# Patient Record
Sex: MALE | Race: WHITE
[De-identification: names, ages, dates, MRNs, and addresses within clinical notes are randomized per-mention and may not be internally consistent; named-entity substitution may affect disease eponyms.]

---

## 2021-06-01 ENCOUNTER — APPOINTMENT (OUTPATIENT)
Dept: BARIATRICS | Facility: CLINIC | Age: 31
End: 2021-06-01
Payer: COMMERCIAL

## 2021-06-01 VITALS — BODY MASS INDEX: 35.07 KG/M2 | WEIGHT: 288 LBS | HEIGHT: 76 IN

## 2021-06-01 DIAGNOSIS — M54.12 RADICULOPATHY, CERVICAL REGION: ICD-10-CM

## 2021-06-01 DIAGNOSIS — Z78.9 OTHER SPECIFIED HEALTH STATUS: ICD-10-CM

## 2021-06-01 DIAGNOSIS — Z00.00 ENCOUNTER FOR GENERAL ADULT MEDICAL EXAMINATION W/OUT ABNORMAL FINDINGS: ICD-10-CM

## 2021-06-01 DIAGNOSIS — Z87.891 PERSONAL HISTORY OF NICOTINE DEPENDENCE: ICD-10-CM

## 2021-06-01 DIAGNOSIS — Z83.3 FAMILY HISTORY OF DIABETES MELLITUS: ICD-10-CM

## 2021-06-01 DIAGNOSIS — Z82.49 FAMILY HISTORY OF ISCHEMIC HEART DISEASE AND OTHER DISEASES OF THE CIRCULATORY SYSTEM: ICD-10-CM

## 2021-06-01 DIAGNOSIS — Z83.438 FAMILY HISTORY OF OTHER DISORDER OF LIPOPROTEIN METABOLISM AND OTHER LIPIDEMIA: ICD-10-CM

## 2021-06-01 PROCEDURE — 99205 OFFICE O/P NEW HI 60 MIN: CPT | Mod: 95

## 2021-06-01 RX ORDER — MONTELUKAST 10 MG/1
10 TABLET, FILM COATED ORAL
Qty: 90 | Refills: 0 | Status: ACTIVE | COMMUNITY
Start: 2021-01-28

## 2021-06-01 RX ORDER — ALBUTEROL SULFATE 90 UG/1
AEROSOL, METERED RESPIRATORY (INHALATION)
Refills: 0 | Status: ACTIVE | COMMUNITY

## 2021-06-01 RX ORDER — FLUTICASONE FUROATE, UMECLIDINIUM BROMIDE AND VILANTEROL TRIFENATATE 200; 62.5; 25 UG/1; UG/1; UG/1
POWDER RESPIRATORY (INHALATION)
Refills: 0 | Status: ACTIVE | COMMUNITY

## 2021-06-02 ENCOUNTER — TRANSCRIPTION ENCOUNTER (OUTPATIENT)
Age: 31
End: 2021-06-02

## 2021-06-04 NOTE — ASSESSMENT
[FreeTextEntry1] : Diagnosis: Obesity Class 2 \par Educated on the importance of healthy lifestyle choices to help with weight loss and prevent co-morbidities of obesity. \par Dietary Modifications- discussed healthy alternatives of carbohydrate & fat intake. Recommend high lean protein intake, complex carbs and healthy fat choices. Discussed ways to reduce late night snacking including- hobbies/ activities that requires hands, taking a walk, talking with friends, avoiding marijuana gummies if they cause hunger, and trying to go to sleep earlier. Start tracking dietary intake daily. \par Exercise: recommend incorporating strength training. Set a goal to add 15 minutes of resistance bands 3 times per week, set new daily steps goal of 7,000 steps daily. Exercise can help with anxiety/depression as well. \par Medication- pt meets criteria to initiate medication treatment for obesity, wants to try lifestyle modification alone at this time. \par Labs- had labs done recently, awaiting results \par RTO in 2 weeks for frequent follow up

## 2021-06-04 NOTE — HISTORY OF PRESENT ILLNESS
[Home] : at home, [unfilled] , at the time of the visit. [Other Location: e.g. Home (Enter Location, City,State)___] : at [unfilled] [Verbal consent obtained from patient] : the patient, [unfilled] [Improved Health] : Improved health [Improved Looks/Aesthetics] : Improved looks/aesthetics [Commerial Program: _____] : commercial program: [unfilled] [Poor eating habits] : poor eating habits [Family/friends] : family/friends [Cravings] : cravings [Motivation] : motivation [Depression/anxiety] : depression/anxiety [Change in activity level] : change in activity level [I usually sleep 6-8 hours] : I usually sleep 6-8 hours [Lunch] : lunch [Dinner] : dinner [______] : [unfilled] [Evening] : evening [Late night] : late night [Crunchy] : crunchy [Salty] : salty [8+] : How many cups of water do you regularly drink per day: 8+ [3] : Cups of coffee per day: 3 [Other: ____] : [unfilled] [Self] : self [Parent] : parent [Emotional Eating] : emotional eating [Boredom Eating] : boredom eating [2+ miles] : Walking distance capability: 2+ miles [Walking] : walking [0] : 0 [None] : none [HTN] : HTN [GERD] : GERD [FreeTextEntry1] : 30 y/o male here for medical weight loss consultation\par Referred by a friend that’s in the program\par Works full time as [employer] , has been struggling with weight gain since he started this job where he sits most of the day\par Exercise: uses lunch hour to walk, gets in about 6,000 steps daily. Denies doing strength training exercising. Enjoys working out at the gym but does want to wear a mask at the gym. Struggles with allergies and asthma, which holds him back as well. \par Dietary Intake- tracks macros\par Food Recall: B- eggs, peas, sausage on bagel; L- skipped, D- hamburger, ribs, sausage, salad, 1 potato. \par Nighttime Snacking- popcorn, granola, kind bar, almonds\par Lives with parents and has access to unhealthy food choices\par Medical Hx: Asthma, HTN, Cervical Radiculopathy, anxiety, depression, ADHD\par Sleeps about 7 hours each night, goes to bed at 2 am \par Water Intake- adequate \par

## 2021-10-21 ENCOUNTER — APPOINTMENT (OUTPATIENT)
Dept: BARIATRICS/WEIGHT MGMT | Facility: CLINIC | Age: 31
End: 2021-10-21
Payer: COMMERCIAL

## 2021-10-21 VITALS — WEIGHT: 272 LBS | HEIGHT: 76 IN | BODY MASS INDEX: 33.12 KG/M2

## 2021-10-21 PROCEDURE — 99214 OFFICE O/P EST MOD 30 MIN: CPT | Mod: 95

## 2021-10-21 NOTE — HISTORY OF PRESENT ILLNESS
[Other Location: e.g. School (Enter Location, City,State)___] : at [unfilled], at the time of the visit. [Other Location: e.g. Home (Enter Location, City,State)___] : at [unfilled] [Verbal consent obtained from patient] : the patient, [unfilled] [FreeTextEntry1] : 30 y/o male here for medical weight loss consultation\par Referred by a friend that’s in the program\par Works full time as [employer] , has been struggling with weight gain since he started this job where he sits most of the day\par Exercise: uses lunch hour to walk, gets in about 6,000 steps daily. Denies doing strength training exercising. Enjoys working out at the gym but does want to wear a mask at the gym. Struggles with allergies and asthma, which holds him back as well. \par Dietary Intake- tracks macros\par Food Recall: B- eggs, peas, sausage on bagel; L- skipped, D- hamburger, ribs, sausage, salad, 1 potato. \par Nighttime Snacking- popcorn, granola, kind bar, almonds\par Lives with parents and has access to unhealthy food choices\par Medical Hx: Asthma, HTN, Cervical Radiculopathy, anxiety, depression, ADHD\par Sleeps about 7 hours each night, goes to bed at 2 am \par Water Intake- adequate \par \par 10/21/21: Lost 16 lbs. Made modifications to lifestyle including: eating breakfast, stops eating earlier at night, decreased snacking, plans breakfast & lunch daily, and measures out appropriate portions. Reports feeling less stressed, started a new job at [employer], operated by [employer]. Food recall: B- overnight oats, L- baked chicken parm with lentil soup, D- roasted pork, broccoli, 1/2 cup of britton white rice. Water intake inadequate. Increased daily steps to 14,000 steps daily. Has access to a gym at the office- doesn't go at present.

## 2021-10-21 NOTE — ASSESSMENT
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

## 2021-12-07 ENCOUNTER — TRANSCRIPTION ENCOUNTER (OUTPATIENT)
Age: 31
End: 2021-12-07

## 2021-12-20 ENCOUNTER — APPOINTMENT (OUTPATIENT)
Dept: BARIATRICS/WEIGHT MGMT | Facility: CLINIC | Age: 31
End: 2021-12-20

## 2023-08-22 RX ORDER — PANTOPRAZOLE SODIUM 40 MG/1
40 TABLET, DELAYED RELEASE ORAL
Refills: 0 | Status: ACTIVE | COMMUNITY

## 2023-08-22 RX ORDER — GUAIFENESIN 600 MG/1
600 TABLET, EXTENDED RELEASE ORAL
Refills: 0 | Status: ACTIVE | COMMUNITY

## 2023-08-22 RX ORDER — ALBUTEROL 90 MCG
90 AEROSOL (GRAM) INHALATION
Refills: 0 | Status: ACTIVE | COMMUNITY

## 2023-08-22 RX ORDER — FLUTICASONE FUROATE, UMECLIDINIUM BROMIDE AND VILANTEROL TRIFENATATE 200; 62.5; 25 UG/1; UG/1; UG/1
200-62.5-25 POWDER RESPIRATORY (INHALATION)
Refills: 0 | Status: ACTIVE | COMMUNITY

## 2023-08-30 ENCOUNTER — APPOINTMENT (OUTPATIENT)
Dept: PULMONOLOGY | Facility: CLINIC | Age: 33
End: 2023-08-30
Payer: COMMERCIAL

## 2023-08-30 ENCOUNTER — LABORATORY RESULT (OUTPATIENT)
Age: 33
End: 2023-08-30

## 2023-08-30 VITALS
BODY MASS INDEX: 33.36 KG/M2 | SYSTOLIC BLOOD PRESSURE: 126 MMHG | HEIGHT: 76 IN | HEART RATE: 71 BPM | WEIGHT: 274 LBS | OXYGEN SATURATION: 98 % | DIASTOLIC BLOOD PRESSURE: 70 MMHG

## 2023-08-30 DIAGNOSIS — T73.2XXS EXHAUSTION DUE TO EXPOSURE, SEQUELA: ICD-10-CM

## 2023-08-30 PROCEDURE — 99203 OFFICE O/P NEW LOW 30 MIN: CPT

## 2023-08-30 NOTE — PHYSICAL EXAM
[No Acute Distress] : no acute distress [Normal Appearance] : normal appearance [No Neck Mass] : no neck mass [Normal Rate/Rhythm] : normal rate/rhythm [Normal S1, S2] : normal s1, s2 [No Murmurs] : no murmurs [No Resp Distress] : no resp distress [No Acc Muscle Use] : no acc muscle use [Clear to Auscultation Bilaterally] : clear to auscultation bilaterally [No Clubbing] : no clubbing [No Cyanosis] : no cyanosis [No Edema] : no edema [No Focal Deficits] : no focal deficits [Oriented x3] : oriented x3 [Normal Affect] : normal affect [TextBox_68] : no wheezes/rhonchi

## 2023-08-30 NOTE — REVIEW OF SYSTEMS
[Cough] : no cough [Sputum] : no sputum [Dyspnea] : no dyspnea [Pleuritic Pain] : no pleuritic pain [Wheezing] : no wheezing [SOB on Exertion] : no sob on exertion [Orthopnea] : no orthopnea [Syncope] : no syncope [GERD] : no gerd [Diarrhea] : no diarrhea [Food Intolerance] : no food intolerance [Trauma/ Injury] : no trauma/ injury [Chronic Pain] : no chronic pain [Anxiety] : anxiety [Negative] : HEENT

## 2023-08-30 NOTE — DISCUSSION/SUMMARY
[FreeTextEntry1] : 33M with asthma here to establish care  #Asthma  - Currently well controlled on regimen of trelegy and albuterol as needed. Also on singulair - He is taking Trelegy every few days with good result so discussed stepping down therapy but Pt prefers to stay on current regimen - Will repeat labs now to evaluate IgE and eos - PFTs ordered now to evaluate lung function - Discussed that asthma is not a contraindication for scuba diving, however, there may be anxiety component which can exacerbate asthma symptoms and worsened while diving. He expresses he is actively seeking mental health care to address his anxiety, especially in light of recent work-related issues. - HIV test ordered today per request, as part of annual routine testing for patient  Will call Pt with results of PFTs and labs

## 2023-08-30 NOTE — HISTORY OF PRESENT ILLNESS
[Never] : never [TextBox_4] : Mr. Kitchen is a 33M here to establish care for asthma. He was previously following with Dr. Rodriguez.  Diagnosed with asthma in his teens. Generally well controlled but recently has had a lot of anxiety due to work-related matters that affects his nerves which also affect his asthma.  Triggers: emotional stress/anxiety, dogs/cats/oak/mice, exertion Exacerbations: rare Current regimen: Trelegy (once every 3 days), Ventolin (rare use, only use as pre-medication before working out); singulair daily  He has been doing well on Trelegy but only takes it every 3 days. He had issues with mucositis/thrush in the past so currently on this regimen.  He is going on a vacation with his boyfriend and family next Jan 2024 to [place]. They plan to scuba dive and he is considering getting scuba certified. He is inquiring about his ability to scuba dive with asthma.

## 2023-08-31 LAB
BASOPHILS # BLD AUTO: 0.09 K/UL
BASOPHILS NFR BLD AUTO: 1 %
EOSINOPHIL # BLD AUTO: 0.43 K/UL
EOSINOPHIL NFR BLD AUTO: 4.8 %
HCT VFR BLD CALC: 50.9 %
HGB BLD-MCNC: 15.8 G/DL
IMM GRANULOCYTES NFR BLD AUTO: 0.1 %
LYMPHOCYTES # BLD AUTO: 2.84 K/UL
LYMPHOCYTES NFR BLD AUTO: 31.4 %
MAN DIFF?: NORMAL
MCHC RBC-ENTMCNC: 26.7 PG
MCHC RBC-ENTMCNC: 31 GM/DL
MCV RBC AUTO: 86.1 FL
MONOCYTES # BLD AUTO: 0.56 K/UL
MONOCYTES NFR BLD AUTO: 6.2 %
NEUTROPHILS # BLD AUTO: 5.11 K/UL
NEUTROPHILS NFR BLD AUTO: 56.5 %
PLATELET # BLD AUTO: 265 K/UL
RBC # BLD: 5.91 M/UL
RBC # FLD: 14.4 %
WBC # FLD AUTO: 9.04 K/UL

## 2023-09-01 LAB
A ALTERNATA IGE QN: <0.1 KUA/L
A FUMIGATUS IGE QN: <0.1 KUA/L
C ALBICANS IGE QN: <0.1 KUA/L
C HERBARUM IGE QN: <0.1 KUA/L
CAT DANDER IGE QN: 11.6 KUA/L
COMMON RAGWEED IGE QN: <0.1 KUA/L
D FARINAE IGE QN: <0.1 KUA/L
D PTERONYSS IGE QN: <0.1 KUA/L
DEPRECATED A ALTERNATA IGE RAST QL: 0
DEPRECATED A FUMIGATUS IGE RAST QL: 0
DEPRECATED C ALBICANS IGE RAST QL: 0
DEPRECATED C HERBARUM IGE RAST QL: 0
DEPRECATED CAT DANDER IGE RAST QL: 3
DEPRECATED COMMON RAGWEED IGE RAST QL: 0
DEPRECATED D FARINAE IGE RAST QL: 0
DEPRECATED D PTERONYSS IGE RAST QL: 0
DEPRECATED DOG DANDER IGE RAST QL: 3
DEPRECATED M RACEMOSUS IGE RAST QL: 0
DEPRECATED ROACH IGE RAST QL: NORMAL
DEPRECATED TIMOTHY IGE RAST QL: 0
DEPRECATED WHITE OAK IGE RAST QL: 1
DOG DANDER IGE QN: 5.93 KUA/L
M RACEMOSUS IGE QN: <0.1 KUA/L
ROACH IGE QN: 0.1 KUA/L
TIMOTHY IGE QN: <0.1 KUA/L
TOTAL IGE SMQN RAST: 119 KU/L
WHITE OAK IGE QN: 0.38 KUA/L

## 2023-11-07 ENCOUNTER — APPOINTMENT (OUTPATIENT)
Dept: PULMONOLOGY | Facility: CLINIC | Age: 33
End: 2023-11-07
Payer: COMMERCIAL

## 2023-11-07 VITALS
OXYGEN SATURATION: 96 % | HEART RATE: 114 BPM | HEIGHT: 76 IN | BODY MASS INDEX: 36.29 KG/M2 | DIASTOLIC BLOOD PRESSURE: 94 MMHG | SYSTOLIC BLOOD PRESSURE: 142 MMHG | WEIGHT: 298 LBS

## 2023-11-07 PROCEDURE — 99213 OFFICE O/P EST LOW 20 MIN: CPT

## 2023-12-07 ENCOUNTER — APPOINTMENT (OUTPATIENT)
Dept: BARIATRICS/WEIGHT MGMT | Facility: CLINIC | Age: 33
End: 2023-12-07
Payer: COMMERCIAL

## 2023-12-07 VITALS
DIASTOLIC BLOOD PRESSURE: 76 MMHG | WEIGHT: 294.38 LBS | HEIGHT: 75 IN | SYSTOLIC BLOOD PRESSURE: 136 MMHG | OXYGEN SATURATION: 99 % | BODY MASS INDEX: 36.6 KG/M2 | HEART RATE: 88 BPM | RESPIRATION RATE: 16 BRPM

## 2023-12-07 PROCEDURE — 97802 MEDICAL NUTRITION INDIV IN: CPT

## 2023-12-20 RX ORDER — ALBUTEROL SULFATE 90 UG/1
108 (90 BASE) INHALANT RESPIRATORY (INHALATION) EVERY 4 HOURS
Qty: 1 | Refills: 5 | Status: ACTIVE | COMMUNITY
Start: 2023-11-07 | End: 1900-01-01

## 2023-12-20 RX ORDER — ALBUTEROL SULFATE 2.5 MG/3ML
(2.5 MG/3ML) SOLUTION RESPIRATORY (INHALATION)
Qty: 3 | Refills: 3 | Status: ACTIVE | COMMUNITY
Start: 2023-12-20 | End: 1900-01-01

## 2024-01-04 ENCOUNTER — APPOINTMENT (OUTPATIENT)
Dept: BARIATRICS/WEIGHT MGMT | Facility: CLINIC | Age: 34
End: 2024-01-04
Payer: COMMERCIAL

## 2024-01-04 VITALS
HEART RATE: 84 BPM | BODY MASS INDEX: 36.43 KG/M2 | DIASTOLIC BLOOD PRESSURE: 86 MMHG | OXYGEN SATURATION: 96 % | WEIGHT: 293 LBS | HEIGHT: 75 IN | RESPIRATION RATE: 16 BRPM | SYSTOLIC BLOOD PRESSURE: 124 MMHG

## 2024-01-04 VITALS — WEIGHT: 293.2 LBS | BODY MASS INDEX: 36.65 KG/M2

## 2024-01-04 PROCEDURE — 97803 MED NUTRITION INDIV SUBSEQ: CPT

## 2024-02-29 ENCOUNTER — APPOINTMENT (OUTPATIENT)
Dept: BARIATRICS/WEIGHT MGMT | Facility: CLINIC | Age: 34
End: 2024-02-29
Payer: COMMERCIAL

## 2024-02-29 VITALS — BODY MASS INDEX: 35.87 KG/M2 | WEIGHT: 287 LBS

## 2024-02-29 DIAGNOSIS — E66.9 OBESITY, UNSPECIFIED: ICD-10-CM

## 2024-02-29 DIAGNOSIS — I10 ESSENTIAL (PRIMARY) HYPERTENSION: ICD-10-CM

## 2024-02-29 PROCEDURE — 97803 MED NUTRITION INDIV SUBSEQ: CPT | Mod: 95

## 2024-05-02 ENCOUNTER — APPOINTMENT (OUTPATIENT)
Dept: BARIATRICS/WEIGHT MGMT | Facility: CLINIC | Age: 34
End: 2024-05-02

## 2024-05-09 ENCOUNTER — RX RENEWAL (OUTPATIENT)
Age: 34
End: 2024-05-09

## 2024-06-18 ENCOUNTER — APPOINTMENT (OUTPATIENT)
Dept: PULMONOLOGY | Facility: CLINIC | Age: 34
End: 2024-06-18
Payer: COMMERCIAL

## 2024-06-18 VITALS
BODY MASS INDEX: 35.31 KG/M2 | HEART RATE: 96 BPM | SYSTOLIC BLOOD PRESSURE: 140 MMHG | WEIGHT: 284 LBS | HEIGHT: 75 IN | OXYGEN SATURATION: 96 % | TEMPERATURE: 98 F | DIASTOLIC BLOOD PRESSURE: 80 MMHG

## 2024-06-18 DIAGNOSIS — K21.9 GASTRO-ESOPHAGEAL REFLUX DISEASE W/OUT ESOPHAGITIS: ICD-10-CM

## 2024-06-18 DIAGNOSIS — J45.909 UNSPECIFIED ASTHMA, UNCOMPLICATED: ICD-10-CM

## 2024-06-18 PROCEDURE — 99213 OFFICE O/P EST LOW 20 MIN: CPT

## 2024-06-18 PROCEDURE — G2211 COMPLEX E/M VISIT ADD ON: CPT

## 2024-06-18 RX ORDER — FLUTICASONE FUROATE, UMECLIDINIUM BROMIDE AND VILANTEROL TRIFENATATE 200; 62.5; 25 UG/1; UG/1; UG/1
200-62.5-25 POWDER RESPIRATORY (INHALATION) DAILY
Qty: 1 | Refills: 5 | Status: ACTIVE | COMMUNITY
Start: 2023-11-07 | End: 1900-01-01

## 2024-06-18 RX ORDER — FAMOTIDINE 20 MG/1
20 TABLET, FILM COATED ORAL
Qty: 30 | Refills: 3 | Status: ACTIVE | COMMUNITY
Start: 2024-06-18 | End: 1900-01-01

## 2024-06-18 RX ORDER — MONTELUKAST 10 MG/1
10 TABLET, FILM COATED ORAL
Qty: 30 | Refills: 5 | Status: ACTIVE | COMMUNITY
Start: 2023-11-07 | End: 1900-01-01

## 2024-06-18 NOTE — PHYSICAL EXAM
[No Acute Distress] : no acute distress [Normal Rate/Rhythm] : normal rate/rhythm [Normal S1, S2] : normal s1, s2 [No Murmurs] : no murmurs [No Resp Distress] : no resp distress [No Acc Muscle Use] : no acc muscle use [Clear to Auscultation Bilaterally] : clear to auscultation bilaterally [No Clubbing] : no clubbing [No Cyanosis] : no cyanosis [No Edema] : no edema [No Focal Deficits] : no focal deficits [Oriented x3] : oriented x3 [Normal Affect] : normal affect [TextBox_68] : no wheezes/rhonchi

## 2024-06-18 NOTE — REVIEW OF SYSTEMS
[Cough] : no cough [Sputum] : no sputum [Dyspnea] : dyspnea [Pleuritic Pain] : no pleuritic pain [Wheezing] : wheezing [SOB on Exertion] : sob on exertion [Orthopnea] : no orthopnea [Syncope] : no syncope [GERD] : no gerd [Diarrhea] : no diarrhea [Food Intolerance] : no food intolerance [Trauma/ Injury] : no trauma/ injury [Chronic Pain] : no chronic pain [Anxiety] : anxiety [Negative] : HEENT

## 2024-06-18 NOTE — HISTORY OF PRESENT ILLNESS
[Never] : never [TextBox_4] : Mr. Coleman is a 33M here to establish care for asthma. He was previously following with Dr. Rodriguez.  Diagnosed with asthma in his teens. Generally well controlled but recently has had a lot of anxiety due to work-related matters that affects his nerves which also affect his asthma.  Triggers: emotional stress/anxiety, dogs/cats/oak/mice, exertion Exacerbations: rare Current regimen: Trelegy (once every 3 days), Ventolin (rare use, only use as pre-medication before working out); singulair daily  He has been doing well on Trelegy but only takes it every 3 days. He had issues with mucositis/thrush in the past so currently on this regimen.  He is going on a vacation with his boyfriend and family next Jan 2024 to [place]. They plan to scuba dive and he is considering getting scuba certified. He is inquiring about his ability to scuba dive with asthma.  11/2023 Mr. Coleman returns today for follow-up asthma. He has been off his inhalers for the past 2 weeks as his previous prescriptions ran out and so he needs new Rx's. He is feeling more shortness of breath and chest tightness with wheezing. He's been taking more OTC mucinex and singulair. Also been using proair about 3 times daily. Today he also mentions that he is frustrated with his weight. He has a strong FHx of T2DM and CAD (his father lost his leg to complications from DM). He is motivated to make lifestyle changes but there are factors in his life that makes it hard and he would like help. He met with [facility] and had a consultation with a surgeon who mentioned that surgery is basically the only option and he felt that he'd like to try to lose weight on his own first. He met with a nutritionist within [facility] and also did not feel his needs were met.  6/2024 Mr. COLEMAN returns today for follow-up. Doing well since last visit but notes that his GERD is really bad lately and so would like to start medications again. He'd like to avoid PPI due to concern about dementia risk as he has a family history of dementia in his grandmother. He has been taking his Trelegy but only once every 3 days due to mouth sores. He has changed his diet significantly and has lost some weight and remains motivated to continue losing weight.

## 2024-06-18 NOTE — DISCUSSION/SUMMARY
[FreeTextEntry1] : 34M with asthma here for follow-up asthma  #Asthma  - Taking Trelegy q3 days due to mouth sores. Oral exam today shows shallow/flat mucosal erosion in hard palate; no e/o thrush. Discussed trying alternative inhaler regimen vs nebulizer therapy but he declines at this time - Also discussed biologics to improve asthma control but he declines as well. - Famotidine Rx sent today for GERD - PFTs reordered today - Encouraged continued weight loss

## 2024-09-27 ENCOUNTER — NON-APPOINTMENT (OUTPATIENT)
Age: 34
End: 2024-09-27

## 2024-10-12 ENCOUNTER — NON-APPOINTMENT (OUTPATIENT)
Age: 34
End: 2024-10-12

## 2024-11-07 ENCOUNTER — APPOINTMENT (OUTPATIENT)
Dept: PULMONOLOGY | Facility: CLINIC | Age: 34
End: 2024-11-07

## 2024-11-07 VITALS
HEART RATE: 83 BPM | DIASTOLIC BLOOD PRESSURE: 84 MMHG | BODY MASS INDEX: 35.31 KG/M2 | SYSTOLIC BLOOD PRESSURE: 112 MMHG | WEIGHT: 284 LBS | HEIGHT: 75 IN | OXYGEN SATURATION: 97 %

## 2024-11-07 DIAGNOSIS — J45.909 UNSPECIFIED ASTHMA, UNCOMPLICATED: ICD-10-CM

## 2024-11-07 DIAGNOSIS — Z23 ENCOUNTER FOR IMMUNIZATION: ICD-10-CM

## 2024-11-07 PROCEDURE — G0008: CPT

## 2024-11-07 PROCEDURE — 99213 OFFICE O/P EST LOW 20 MIN: CPT | Mod: 25

## 2024-11-07 PROCEDURE — 90686 IIV4 VACC NO PRSV 0.5 ML IM: CPT

## 2025-02-18 ENCOUNTER — RX RENEWAL (OUTPATIENT)
Age: 35
End: 2025-02-18

## 2025-05-07 ENCOUNTER — APPOINTMENT (OUTPATIENT)
Dept: PULMONOLOGY | Facility: CLINIC | Age: 35
End: 2025-05-07
Payer: COMMERCIAL

## 2025-05-07 VITALS
BODY MASS INDEX: 34.69 KG/M2 | SYSTOLIC BLOOD PRESSURE: 102 MMHG | HEIGHT: 75 IN | DIASTOLIC BLOOD PRESSURE: 80 MMHG | OXYGEN SATURATION: 98 % | HEART RATE: 86 BPM | WEIGHT: 279 LBS

## 2025-05-07 DIAGNOSIS — J45.909 UNSPECIFIED ASTHMA, UNCOMPLICATED: ICD-10-CM

## 2025-05-07 DIAGNOSIS — K21.9 GASTRO-ESOPHAGEAL REFLUX DISEASE W/OUT ESOPHAGITIS: ICD-10-CM

## 2025-05-07 PROCEDURE — 99214 OFFICE O/P EST MOD 30 MIN: CPT

## 2025-05-07 PROCEDURE — G2211 COMPLEX E/M VISIT ADD ON: CPT | Mod: NC

## 2025-05-07 RX ORDER — PANTOPRAZOLE 40 MG/1
40 TABLET, DELAYED RELEASE ORAL DAILY
Qty: 30 | Refills: 5 | Status: ACTIVE | COMMUNITY
Start: 1900-01-01 | End: 1900-01-01

## 2025-05-20 ENCOUNTER — RX RENEWAL (OUTPATIENT)
Age: 35
End: 2025-05-20

## 2025-08-12 ENCOUNTER — NON-APPOINTMENT (OUTPATIENT)
Age: 35
End: 2025-08-12

## 2025-08-13 ENCOUNTER — NON-APPOINTMENT (OUTPATIENT)
Age: 35
End: 2025-08-13

## 2025-08-13 ENCOUNTER — APPOINTMENT (OUTPATIENT)
Dept: FAMILY MEDICINE | Facility: CLINIC | Age: 35
End: 2025-08-13
Payer: COMMERCIAL

## 2025-08-13 VITALS
SYSTOLIC BLOOD PRESSURE: 130 MMHG | OXYGEN SATURATION: 96 % | TEMPERATURE: 98.3 F | HEIGHT: 75 IN | HEART RATE: 96 BPM | WEIGHT: 284 LBS | BODY MASS INDEX: 35.31 KG/M2 | DIASTOLIC BLOOD PRESSURE: 92 MMHG

## 2025-08-13 VITALS — DIASTOLIC BLOOD PRESSURE: 76 MMHG | SYSTOLIC BLOOD PRESSURE: 124 MMHG

## 2025-08-13 DIAGNOSIS — I10 ESSENTIAL (PRIMARY) HYPERTENSION: ICD-10-CM

## 2025-08-13 DIAGNOSIS — K21.9 GASTRO-ESOPHAGEAL REFLUX DISEASE W/OUT ESOPHAGITIS: ICD-10-CM

## 2025-08-13 DIAGNOSIS — R10.31 RIGHT LOWER QUADRANT PAIN: ICD-10-CM

## 2025-08-13 DIAGNOSIS — G89.29 RIGHT LOWER QUADRANT PAIN: ICD-10-CM

## 2025-08-13 DIAGNOSIS — Z00.00 ENCOUNTER FOR GENERAL ADULT MEDICAL EXAMINATION W/OUT ABNORMAL FINDINGS: ICD-10-CM

## 2025-08-13 DIAGNOSIS — E66.812 OBESITY, CLASS 2: ICD-10-CM

## 2025-08-13 DIAGNOSIS — Z80.0 FAMILY HISTORY OF MALIGNANT NEOPLASM OF DIGESTIVE ORGANS: ICD-10-CM

## 2025-08-13 DIAGNOSIS — R09.81 NASAL CONGESTION: ICD-10-CM

## 2025-08-13 PROCEDURE — G0444 DEPRESSION SCREEN ANNUAL: CPT | Mod: 59

## 2025-08-13 PROCEDURE — 99385 PREV VISIT NEW AGE 18-39: CPT

## 2025-08-15 ENCOUNTER — TRANSCRIPTION ENCOUNTER (OUTPATIENT)
Age: 35
End: 2025-08-15

## 2025-08-15 LAB
ALBUMIN SERPL ELPH-MCNC: 4.5 G/DL
ALP BLD-CCNC: 86 U/L
ALT SERPL-CCNC: 36 U/L
ANION GAP SERPL CALC-SCNC: 15 MMOL/L
AST SERPL-CCNC: 28 U/L
BASOPHILS # BLD AUTO: 0.11 K/UL
BASOPHILS NFR BLD AUTO: 1.2 %
BILIRUB SERPL-MCNC: 0.8 MG/DL
BUN SERPL-MCNC: 26 MG/DL
CALCIUM SERPL-MCNC: 9.7 MG/DL
CHLORIDE SERPL-SCNC: 103 MMOL/L
CHOLEST SERPL-MCNC: 201 MG/DL
CO2 SERPL-SCNC: 23 MMOL/L
CREAT SERPL-MCNC: 0.92 MG/DL
EGFRCR SERPLBLD CKD-EPI 2021: 113 ML/MIN/1.73M2
EOSINOPHIL # BLD AUTO: 0.73 K/UL
EOSINOPHIL NFR BLD AUTO: 7.8 %
ESTIMATED AVERAGE GLUCOSE: 108 MG/DL
GLUCOSE SERPL-MCNC: 91 MG/DL
HBA1C MFR BLD HPLC: 5.4 %
HCT VFR BLD CALC: 51.2 %
HDLC SERPL-MCNC: 36 MG/DL
HGB BLD-MCNC: 16.2 G/DL
IMM GRANULOCYTES NFR BLD AUTO: 0.2 %
LDLC SERPL-MCNC: 123 MG/DL
LYMPHOCYTES # BLD AUTO: 2.62 K/UL
LYMPHOCYTES NFR BLD AUTO: 28.1 %
MAN DIFF?: NORMAL
MCHC RBC-ENTMCNC: 27.8 PG
MCHC RBC-ENTMCNC: 31.6 G/DL
MCV RBC AUTO: 88 FL
MONOCYTES # BLD AUTO: 0.69 K/UL
MONOCYTES NFR BLD AUTO: 7.4 %
NEUTROPHILS # BLD AUTO: 5.16 K/UL
NEUTROPHILS NFR BLD AUTO: 55.3 %
NONHDLC SERPL-MCNC: 164 MG/DL
PLATELET # BLD AUTO: 281 K/UL
POTASSIUM SERPL-SCNC: 4.6 MMOL/L
PROT SERPL-MCNC: 7.2 G/DL
RBC # BLD: 5.82 M/UL
RBC # FLD: 13.3 %
SODIUM SERPL-SCNC: 141 MMOL/L
TRIGL SERPL-MCNC: 233 MG/DL
TSH SERPL-ACNC: 1.38 UIU/ML
WBC # FLD AUTO: 9.33 K/UL

## 2025-09-05 ENCOUNTER — RX RENEWAL (OUTPATIENT)
Age: 35
End: 2025-09-05

## 2025-09-08 RX ORDER — ALBUTEROL SULFATE 90 UG/1
108 (90 BASE) AEROSOL, METERED RESPIRATORY (INHALATION)
Qty: 1 | Refills: 5 | Status: ACTIVE | COMMUNITY
Start: 2025-09-08 | End: 1900-01-01